# Patient Record
Sex: FEMALE | URBAN - METROPOLITAN AREA
[De-identification: names, ages, dates, MRNs, and addresses within clinical notes are randomized per-mention and may not be internally consistent; named-entity substitution may affect disease eponyms.]

---

## 2020-07-14 ENCOUNTER — COMPLETE SKIN EXAM (OUTPATIENT)
Dept: URBAN - METROPOLITAN AREA CLINIC 17 | Facility: CLINIC | Age: 25
Setting detail: DERMATOLOGY
End: 2020-07-14

## 2020-07-14 DIAGNOSIS — L57.0 ACTINIC KERATOSIS: ICD-10-CM

## 2020-07-14 PROBLEM — L84 CORNS AND CALLOSITIES: Status: RESOLVED | Noted: 2020-07-14

## 2020-07-14 PROBLEM — B07.0 PLANTAR WART: Status: RESOLVED | Noted: 2020-07-14

## 2020-07-14 PROCEDURE — 99212 OFFICE O/P EST SF 10 MIN: CPT

## 2020-07-14 PROCEDURE — 17110 DESTRUCT B9 LESION 1-14: CPT

## 2020-07-14 RX ORDER — SPIRONOLACTONE 50 MG/1
1 TABLET TABLET, FILM COATED ORAL ONCE A DAY
Qty: 90 | Refills: 3
Start: 2020-07-14

## 2020-12-30 ENCOUNTER — FOLLOW-UP (OUTPATIENT)
Dept: URBAN - METROPOLITAN AREA CLINIC 17 | Facility: CLINIC | Age: 25
Setting detail: DERMATOLOGY
End: 2020-12-30

## 2020-12-30 ENCOUNTER — RX ONLY (RX ONLY)
Age: 25
End: 2020-12-30

## 2020-12-30 DIAGNOSIS — L70.0 ACNE VULGARIS: ICD-10-CM

## 2020-12-30 PROCEDURE — 99213 OFFICE O/P EST LOW 20 MIN: CPT

## 2020-12-30 RX ORDER — TRETINOIN 0.25 MG/G
CREAM TOPICAL
Qty: 45 | Refills: 3
Start: 2020-12-30

## 2020-12-30 RX ORDER — CLINDAMYCIN PHOSPHATE AND BENZOYL PEROXIDE 1 %-5 %
KIT TOPICAL
Qty: 50 | Refills: 3
Start: 2020-12-30

## 2021-10-29 ENCOUNTER — FOLLOW-UP (OUTPATIENT)
Dept: URBAN - METROPOLITAN AREA CLINIC 17 | Facility: CLINIC | Age: 26
Setting detail: DERMATOLOGY
End: 2021-10-29

## 2021-10-29 ENCOUNTER — RX ONLY (RX ONLY)
Age: 26
End: 2021-10-29

## 2021-10-29 DIAGNOSIS — L82.1 OTHER SEBORRHEIC KERATOSIS: ICD-10-CM

## 2021-10-29 DIAGNOSIS — D22.5 MELANOCYTIC NEVI OF TRUNK: ICD-10-CM

## 2021-10-29 DIAGNOSIS — L81.4 OTHER MELANIN HYPERPIGMENTATION: ICD-10-CM

## 2021-10-29 PROCEDURE — 99213 OFFICE O/P EST LOW 20 MIN: CPT

## 2021-10-29 PROCEDURE — 17110 DESTRUCT B9 LESION 1-14: CPT

## 2021-10-29 RX ORDER — SPIRONOLACTONE 50 MG/1
TABLET, FILM COATED ORAL
Qty: 90 | Refills: 3
Start: 2021-10-29

## 2022-12-28 ENCOUNTER — APPOINTMENT (OUTPATIENT)
Dept: URBAN - METROPOLITAN AREA CLINIC 191 | Age: 27
Setting detail: DERMATOLOGY
End: 2022-12-28

## 2022-12-28 DIAGNOSIS — L70.0 ACNE VULGARIS: ICD-10-CM

## 2022-12-28 PROCEDURE — OTHER PRESCRIPTION MEDICATION MANAGEMENT: OTHER

## 2022-12-28 PROCEDURE — OTHER MIPS QUALITY: OTHER

## 2022-12-28 PROCEDURE — OTHER PRESCRIPTION: OTHER

## 2022-12-28 PROCEDURE — 99214 OFFICE O/P EST MOD 30 MIN: CPT

## 2022-12-28 RX ORDER — TRETIONIN 0.5 MG/G
CREAM TOPICAL
Qty: 45 | Refills: 3 | Status: ERX | COMMUNITY
Start: 2022-12-28

## 2022-12-28 RX ORDER — CLINDAMYCIN PHOSPHATE AND BENZOYL PEROXIDE 1 %-5 %
KIT TOPICAL
Qty: 50 | Refills: 3 | Status: ERX | COMMUNITY
Start: 2022-12-28

## 2022-12-28 NOTE — PROCEDURE: PRESCRIPTION MEDICATION MANAGEMENT
Detail Level: Zone
Plan: pt stopped SLAC 50mg qdaily for 3 months and had a bad flare. Has PIH and a few remaining papule and has since restarted. discussed safety of continuing slac. Does not appear to increase breast cancer risk. \\n- c/w slac 50mg qdaily; try to decrease to 25mg in the future\\n- increase to tretinoin 0.05% qhs\\n- refill clinda-bpo gel qAM\\n-
Render In Strict Bullet Format?: No

## 2023-09-05 ENCOUNTER — APPOINTMENT (OUTPATIENT)
Dept: URBAN - METROPOLITAN AREA CLINIC 191 | Age: 28
Setting detail: DERMATOLOGY
End: 2023-09-05

## 2023-09-05 DIAGNOSIS — L70.0 ACNE VULGARIS: ICD-10-CM

## 2023-09-05 PROCEDURE — 99214 OFFICE O/P EST MOD 30 MIN: CPT

## 2023-09-05 PROCEDURE — OTHER PRESCRIPTION MEDICATION MANAGEMENT: OTHER

## 2023-09-05 PROCEDURE — OTHER MIPS QUALITY: OTHER

## 2023-09-05 PROCEDURE — OTHER PRESCRIPTION: OTHER

## 2023-09-05 RX ORDER — CLINDAMYCIN PHOSPHATE AND BENZOYL PEROXIDE 12; 50 MG/G; MG/G
APPLY GEL TOPICAL
Qty: 45 | Refills: 3 | Status: ERX | COMMUNITY
Start: 2023-09-05

## 2023-09-05 RX ORDER — DOXYCYCLINE 100 MG/1
1 CAPSULE CAPSULE ORAL BID
Qty: 60 | Refills: 3 | Status: ERX | COMMUNITY
Start: 2023-09-05

## 2023-09-05 RX ORDER — TRETIONIN 0.5 MG/G
APPLY CREAM TOPICAL
Qty: 45 | Refills: 5 | Status: ERX

## 2023-09-05 RX ORDER — SPIRONOLACTONE 50 MG/1
1 TABLET TABLET, FILM COATED ORAL DAILY
Qty: 90 | Refills: 1 | Status: ERX | COMMUNITY
Start: 2023-09-05

## 2023-09-05 NOTE — PROCEDURE: PRESCRIPTION MEDICATION MANAGEMENT
Detail Level: Zone
Plan: Doxycycline Monohydrate 100 mg capsule - take twice daily with food/plenty of liquid and do not lie down within 1 hour. Once acne is no longer flaring reduce to once daily\\nMild cleanser (Cetaphil or Cerave sensitive skin) - use before applying medications\\nClindamycin/BP gel to entire face each morning, spot treat flares in afternoon\\nSunscreen with Zinc base (ex. Olay Complete SPF 15 lotion or 30 cream) after applying Clindamycin/BPO\\nIncrease use of Tretinoin 0.05% cream use to every night as tolerated\\nContinue Spironolactone 50 mg daily
Render In Strict Bullet Format?: No

## 2023-11-20 ENCOUNTER — APPOINTMENT (OUTPATIENT)
Dept: URBAN - METROPOLITAN AREA CLINIC 191 | Age: 28
Setting detail: DERMATOLOGY
End: 2023-11-20

## 2023-11-20 DIAGNOSIS — L70.0 ACNE VULGARIS: ICD-10-CM

## 2023-11-20 PROCEDURE — 99214 OFFICE O/P EST MOD 30 MIN: CPT

## 2023-11-20 PROCEDURE — OTHER MEDICATION COUNSELING: OTHER

## 2023-11-20 PROCEDURE — OTHER MIPS QUALITY: OTHER

## 2023-11-20 PROCEDURE — OTHER PRESCRIPTION MEDICATION MANAGEMENT: OTHER

## 2023-11-20 PROCEDURE — OTHER PRESCRIPTION: OTHER

## 2023-11-20 RX ORDER — DAPSONE 75 MG/G
APPLY GEL TOPICAL QAM
Qty: 60 | Refills: 4 | Status: ERX | COMMUNITY
Start: 2023-11-20

## 2023-11-20 ASSESSMENT — LOCATION SIMPLE DESCRIPTION DERM
LOCATION SIMPLE: LEFT CHEEK
LOCATION SIMPLE: RIGHT CHEEK

## 2023-11-20 ASSESSMENT — LOCATION DETAILED DESCRIPTION DERM
LOCATION DETAILED: LEFT INFERIOR CENTRAL MALAR CHEEK
LOCATION DETAILED: RIGHT INFERIOR CENTRAL MALAR CHEEK

## 2023-11-20 ASSESSMENT — LOCATION ZONE DERM: LOCATION ZONE: FACE

## 2023-11-20 NOTE — PROCEDURE: PRESCRIPTION MEDICATION MANAGEMENT
Plan: Doxycycline Monohydrate 100 mg capsule - take twice daily with food/plenty of liquid and do not lie down within 1 hour. Once acne is no longer flaring reduce to once daily\\nMild cleanser (Cetaphil or Cerave sensitive skin) - use before applying medications\\nChange to Dapsone 7.5% gel to entire face each morning\\nSunscreen with Zinc base (ex. Olay Complete SPF 15 lotion or 30 cream) after applying Dapsone\\nSpot treat flares with Clindamycin/BPO gel twice daily as tolerated\\nTretinoin 0.05% cream - slowly increase use to every night as tolerated\\nContinue Spironolactone 50 mg daily - patient reported abnormal periods with higher dose 6 years ago\\nPatient actively seeking dermatologist closer to her home in [place], [place]; currently visiting parents in [place]\\nDiscussed Accutane therapy if acne is recalcitrant Plan: Doxycycline Monohydrate 100 mg capsule - take twice daily with food/plenty of liquid and do not lie down within 1 hour. Once acne is no longer flaring reduce to once daily\\nMild cleanser (Cetaphil or Cerave sensitive skin) - use before applying medications\\nChange to Dapsone 7.5% gel to entire face each morning\\nSunscreen with Zinc base (ex. Olay Complete SPF 15 lotion or 30 cream) after applying Dapsone\\nSpot treat flares with Clindamycin/BPO gel twice daily as tolerated\\nTretinoin 0.05% cream - slowly increase use to every night as tolerated\\nContinue Spironolactone 50 mg daily - patient reported abnormal periods with higher dose 6 years ago\\nPatient actively seeking dermatologist closer to her home in [place], [place]; currently visiting parents in [place]\\nDiscussed Accutane therapy if acne is recalcitrant

## 2023-11-20 NOTE — PROCEDURE: MEDICATION COUNSELING
Xelnimishaz Pregnancy And Lactation Text: This medication is Pregnancy Category D and is not considered safe during pregnancy.  The risk during breast feeding is also uncertain.

## 2023-11-20 NOTE — PROCEDURE: MEDICATION COUNSELING
Detail Level: Generalized Topical Steroids Counseling: I discussed with the patient that prolonged use of topical steroids can result in the increased appearance of superficial blood vessels (telangiectasias), lightening (hypopigmentation) and thinning of the skin (atrophy).  Patient understands to avoid using high potency steroids in skin folds, the groin or the face.  The patient verbalized understanding of the proper use and possible adverse effects of topical steroids.  All of the patient's questions and concerns were addressed.

## 2023-11-20 NOTE — PROCEDURE: MEDICATION COUNSELING
MVA Picato Counseling:  I discussed with the patient the risks of Picato including but not limited to erythema, scaling, itching, weeping, crusting, and pain.